# Patient Record
Sex: FEMALE | Race: BLACK OR AFRICAN AMERICAN | NOT HISPANIC OR LATINO | Employment: OTHER | URBAN - METROPOLITAN AREA
[De-identification: names, ages, dates, MRNs, and addresses within clinical notes are randomized per-mention and may not be internally consistent; named-entity substitution may affect disease eponyms.]

---

## 2017-02-06 PROBLEM — R29.3 ABNORMAL POSTURE: Status: ACTIVE | Noted: 2017-02-06

## 2017-02-06 PROBLEM — Z74.09 DECREASED STRENGTH, ENDURANCE, AND MOBILITY: Status: ACTIVE | Noted: 2017-02-06

## 2017-02-06 PROBLEM — I69.328 OTHER SPEECH AND LANGUAGE DEFICITS FOLLOWING CEREBRAL INFARCTION: Status: ACTIVE | Noted: 2017-02-06

## 2017-02-06 PROBLEM — R68.89 DECREASED STRENGTH, ENDURANCE, AND MOBILITY: Status: ACTIVE | Noted: 2017-02-06

## 2017-02-06 PROBLEM — R26.2 DIFFICULTY WALKING: Status: ACTIVE | Noted: 2017-02-06

## 2017-02-06 PROBLEM — I63.9 CEREBROVASCULAR ACCIDENT (CVA): Status: ACTIVE | Noted: 2017-02-06

## 2017-02-06 PROBLEM — M62.81 MUSCLE WEAKNESS OF LOWER EXTREMITY: Status: ACTIVE | Noted: 2017-02-06

## 2017-02-06 PROBLEM — M62.81 MUSCLE WEAKNESS OF RIGHT UPPER EXTREMITY: Status: ACTIVE | Noted: 2017-02-06

## 2017-02-06 PROBLEM — R53.1 DECREASED STRENGTH, ENDURANCE, AND MOBILITY: Status: ACTIVE | Noted: 2017-02-06

## 2017-02-06 PROBLEM — I69.322 DYSARTHRIA DUE TO RECENT CEREBRAL INFARCTION: Status: ACTIVE | Noted: 2017-02-06

## 2017-02-09 PROBLEM — I69.90 LATE EFFECTS OF CVA (CEREBROVASCULAR ACCIDENT): Status: ACTIVE | Noted: 2017-02-09

## 2017-02-16 PROBLEM — R41.3 SHORT-TERM MEMORY LOSS: Status: ACTIVE | Noted: 2017-02-16

## 2017-02-16 PROBLEM — I69.310 ATTENTION AND CONCENTRATION DEFICIT FOLLOWING CEREBRAL INFARCTION: Status: ACTIVE | Noted: 2017-02-16

## 2017-02-16 PROBLEM — R41.89 IMPAIRED PROBLEM SOLVING: Status: ACTIVE | Noted: 2017-02-16

## 2017-02-22 PROBLEM — Z78.9 DEFICIT IN ACTIVITIES OF DAILY LIVING (ADL): Status: ACTIVE | Noted: 2017-02-22

## 2017-03-06 PROBLEM — I69.322 DYSARTHRIA DUE TO RECENT CEREBRAL INFARCTION: Status: RESOLVED | Noted: 2017-02-06 | Resolved: 2017-03-06

## 2019-04-24 PROBLEM — D64.9 NORMOCYTIC ANEMIA: Status: ACTIVE | Noted: 2019-04-24

## 2019-04-24 PROBLEM — E55.9 VITAMIN D DEFICIENCY: Status: ACTIVE | Noted: 2019-04-24

## 2019-04-24 PROBLEM — R41.89 IMPAIRED PROBLEM SOLVING: Status: RESOLVED | Noted: 2017-02-16 | Resolved: 2019-04-24

## 2019-04-24 PROBLEM — I10 ESSENTIAL (PRIMARY) HYPERTENSION: Status: ACTIVE | Noted: 2019-04-24

## 2019-04-24 PROBLEM — E78.00 HYPERCHOLESTEREMIA: Status: ACTIVE | Noted: 2019-04-24

## 2019-04-24 PROBLEM — N18.30 CKD (CHRONIC KIDNEY DISEASE) STAGE 3, GFR 30-59 ML/MIN: Status: ACTIVE | Noted: 2019-04-24

## 2019-06-19 PROBLEM — N18.4 CKD (CHRONIC KIDNEY DISEASE) STAGE 4, GFR 15-29 ML/MIN: Status: ACTIVE | Noted: 2019-04-24

## 2020-01-15 PROBLEM — I69.90 LATE EFFECTS OF CVA (CEREBROVASCULAR ACCIDENT): Chronic | Status: ACTIVE | Noted: 2017-02-09

## 2020-01-15 PROBLEM — E55.9 VITAMIN D DEFICIENCY: Chronic | Status: ACTIVE | Noted: 2019-04-24

## 2020-01-15 PROBLEM — N18.4 CKD (CHRONIC KIDNEY DISEASE) STAGE 4, GFR 15-29 ML/MIN: Chronic | Status: ACTIVE | Noted: 2019-04-24

## 2020-01-15 PROBLEM — I10 ESSENTIAL (PRIMARY) HYPERTENSION: Chronic | Status: ACTIVE | Noted: 2019-04-24

## 2020-01-15 PROBLEM — E78.00 HYPERCHOLESTEREMIA: Chronic | Status: ACTIVE | Noted: 2019-04-24

## 2020-01-15 PROBLEM — D64.9 NORMOCYTIC ANEMIA: Chronic | Status: ACTIVE | Noted: 2019-04-24

## 2020-07-09 PROBLEM — N25.81 HYPERPARATHYROIDISM, SECONDARY RENAL: Status: ACTIVE | Noted: 2020-07-09

## 2020-12-14 PROBLEM — N25.81 HYPERPARATHYROIDISM, SECONDARY RENAL: Chronic | Status: ACTIVE | Noted: 2020-07-09

## 2020-12-15 PROBLEM — E03.8 SUBCLINICAL HYPOTHYROIDISM: Status: ACTIVE | Noted: 2020-12-15

## 2021-04-06 PROBLEM — E03.8 SUBCLINICAL HYPOTHYROIDISM: Chronic | Status: ACTIVE | Noted: 2020-12-15

## 2022-06-03 PROBLEM — Z74.09 DECREASED STRENGTH, ENDURANCE, AND MOBILITY: Status: RESOLVED | Noted: 2017-02-06 | Resolved: 2022-06-03

## 2022-06-03 PROBLEM — R68.89 DECREASED STRENGTH, ENDURANCE, AND MOBILITY: Status: RESOLVED | Noted: 2017-02-06 | Resolved: 2022-06-03

## 2022-06-03 PROBLEM — I69.328 OTHER SPEECH AND LANGUAGE DEFICITS FOLLOWING CEREBRAL INFARCTION: Status: RESOLVED | Noted: 2017-02-06 | Resolved: 2022-06-03

## 2022-06-03 PROBLEM — R41.3 SHORT-TERM MEMORY LOSS: Status: RESOLVED | Noted: 2017-02-16 | Resolved: 2022-06-03

## 2022-06-03 PROBLEM — Z78.9 DEFICIT IN ACTIVITIES OF DAILY LIVING (ADL): Status: RESOLVED | Noted: 2017-02-22 | Resolved: 2022-06-03

## 2022-06-03 PROBLEM — M62.81 MUSCLE WEAKNESS OF RIGHT UPPER EXTREMITY: Status: RESOLVED | Noted: 2017-02-06 | Resolved: 2022-06-03

## 2022-06-03 PROBLEM — R26.2 DIFFICULTY WALKING: Status: RESOLVED | Noted: 2017-02-06 | Resolved: 2022-06-03

## 2022-06-03 PROBLEM — I69.310 ATTENTION AND CONCENTRATION DEFICIT FOLLOWING CEREBRAL INFARCTION: Status: RESOLVED | Noted: 2017-02-16 | Resolved: 2022-06-03

## 2022-06-03 PROBLEM — I63.9 CEREBROVASCULAR ACCIDENT (CVA): Status: RESOLVED | Noted: 2017-02-06 | Resolved: 2022-06-03

## 2022-06-03 PROBLEM — R29.3 ABNORMAL POSTURE: Status: RESOLVED | Noted: 2017-02-06 | Resolved: 2022-06-03

## 2022-06-03 PROBLEM — R53.1 DECREASED STRENGTH, ENDURANCE, AND MOBILITY: Status: RESOLVED | Noted: 2017-02-06 | Resolved: 2022-06-03

## 2022-06-03 PROBLEM — M62.81 MUSCLE WEAKNESS OF LOWER EXTREMITY: Status: RESOLVED | Noted: 2017-02-06 | Resolved: 2022-06-03

## 2022-07-27 ENCOUNTER — PES CALL (OUTPATIENT)
Dept: ADMINISTRATIVE | Facility: CLINIC | Age: 82
End: 2022-07-27

## 2023-01-05 PROBLEM — M25.562 CHRONIC PAIN OF LEFT KNEE: Status: ACTIVE | Noted: 2023-01-05

## 2023-01-05 PROBLEM — G89.29 CHRONIC PAIN OF LEFT KNEE: Status: ACTIVE | Noted: 2023-01-05

## 2023-07-03 PROBLEM — B02.0 ZOSTER ENCEPHALITIS: Status: ACTIVE | Noted: 2023-07-03

## 2023-07-03 PROBLEM — B02.30 ZOSTER OPHTHALMICUS: Status: ACTIVE | Noted: 2023-07-03

## 2023-07-03 PROBLEM — N17.9 AKI (ACUTE KIDNEY INJURY): Status: ACTIVE | Noted: 2023-07-03

## 2023-10-02 PROBLEM — N17.9 AKI (ACUTE KIDNEY INJURY): Status: RESOLVED | Noted: 2023-07-03 | Resolved: 2023-10-02

## 2025-05-21 PROBLEM — I25.10 ATHEROSCLEROSIS OF CORONARY ARTERY: Status: ACTIVE | Noted: 2025-05-21

## 2025-07-30 PROBLEM — U07.1 COVID-19: Status: ACTIVE | Noted: 2025-07-30

## 2025-07-31 PROBLEM — K62.5 RECTAL BLEEDING: Status: ACTIVE | Noted: 2025-07-31

## 2025-08-05 ENCOUNTER — PATIENT OUTREACH (OUTPATIENT)
Dept: ADMINISTRATIVE | Facility: CLINIC | Age: 85
End: 2025-08-05

## 2025-08-05 NOTE — PROGRESS NOTES
C3 nurse spoke with Lucille Toscano's daughter, Arpita for a TCC post hospital discharge follow up call. The patient has a scheduled HOSFU appointment with John Sneed MD on 08/06/25 @ 1400, rescheduled. Original appt below  John Sneed MD on 08/12/25 @ 5070    
[id number]